# Patient Record
(demographics unavailable — no encounter records)

---

## 2025-02-05 NOTE — INTERPRETER SERVICES
[Other: ______] : provided by ABELARDO [Time Spent: ____ minutes] : Total time spent using  services: [unfilled] minutes. The patient's primary language is not English thus required  services. [Interpreters_IDNumber] : [id number] [Interpreters_FullName] : Sugar [TWNoteComboBox1] : Thai

## 2025-02-05 NOTE — HISTORY OF PRESENT ILLNESS
[de-identified] : 45 y/o F presenting to establish care. + past few weeks with chest discomfort. No chest discomfort with palpation. Worse with physical exertion.  + also with past few weeks LLQ pain, worse after meals, better with BM. Mild constipation. No melena/hematochezia. No fever or weight loss. + past few months with fatigue on exertion Periods regular  Sx Hx: NA   Family Hx: Mom- HTN   Social Hx: never smoker, no alcohol or illicit drug use. , 1 child.

## 2025-02-05 NOTE — ASSESSMENT
[FreeTextEntry1] : .  45 y/o F presenting to establish care. HPI as above.   # chest discomfort # fatigue on exertion - EKG - cardio eval  # intermittent LLQ pain- constipation related? # constipation - adequate hydration and fiber intake via diet +/- supplement - rec daily miralax titrated to daily soft BM or senna if above not efficacious - pt will f/u in few weeks if doesn't resolve with above  # HCM - f/u labs - mammo UTD 9/2024 - s/p flu vaccine - GI referral for scope  f/u pending above w/u

## 2025-02-21 NOTE — ASSESSMENT
[FreeTextEntry1] : . 47 y/o F with HLD presenting for f/u. HPI as above.  # HLD - reviewed recent , ASCVD low risk - Counselled on diet, exercise, weight loss.   # chest discomfort- improved # fatigue on exertion- improved - cardio eval  # intermittent LLQ pain- constipation related? # constipation- improving - adequate hydration and fiber intake via diet +/- supplement - rec daily miralax titrated to daily soft BM or senna if above not efficacious - f/u US and GI eval as never had scope  # HCM - mammo UTD 9/2024 - s/p flu vaccine - GI referral for scope  f/u 3 months

## 2025-02-21 NOTE — HISTORY OF PRESENT ILLNESS
[Home] : at home, [unfilled] , at the time of the visit. [Medical Office: ([facility])___] : at the medical office located in  [Telephone (audio)] : This telephonic visit was provided via audio only technology. [Patient preference] : patient preference. [Verbal consent obtained from patient] : the patient, [unfilled] [de-identified] : 45 y/o F with HLD presenting for f/u. + previous chest discomfort and fatigue on exertion seems to have improved with diet changes. + constipation improved with increased water intake, miralax, and less carbs. However mild LLQ pain described as "little pinch" 1-2 times a day persists. Periods regular

## 2025-02-21 NOTE — INTERPRETER SERVICES
[Other: ______] : provided by ABELARDO [Time Spent: ____ minutes] : Total time spent using  services: [unfilled] minutes. The patient's primary language is not English thus required  services. [Interpreters_IDNumber] : [id number] [Interpreters_FullName] : Manolo [TWNoteComboBox1] : [place]

## 2025-02-21 NOTE — REVIEW OF SYSTEMS
[Negative] : Heme/Lymph [FreeTextEntry5] :  as per HPI  [FreeTextEntry6] :  as per HPI  [FreeTextEntry7] :  as per HPI